# Patient Record
Sex: MALE | Race: ASIAN
[De-identification: names, ages, dates, MRNs, and addresses within clinical notes are randomized per-mention and may not be internally consistent; named-entity substitution may affect disease eponyms.]

---

## 2018-01-16 ENCOUNTER — HOSPITAL ENCOUNTER (EMERGENCY)
Dept: HOSPITAL 17 - PHEFT | Age: 28
Discharge: HOME | End: 2018-01-16
Payer: COMMERCIAL

## 2018-01-16 VITALS — HEIGHT: 66 IN | WEIGHT: 177.03 LBS | BODY MASS INDEX: 28.45 KG/M2

## 2018-01-16 VITALS
OXYGEN SATURATION: 97 % | SYSTOLIC BLOOD PRESSURE: 150 MMHG | RESPIRATION RATE: 16 BRPM | DIASTOLIC BLOOD PRESSURE: 67 MMHG | HEART RATE: 81 BPM | TEMPERATURE: 98.5 F

## 2018-01-16 DIAGNOSIS — S29.012A: ICD-10-CM
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
## 2018-01-16 DIAGNOSIS — Z72.0: ICD-10-CM

## 2018-01-16 DIAGNOSIS — S16.1XXA: Primary | ICD-10-CM

## 2018-01-16 PROCEDURE — 99283 EMERGENCY DEPT VISIT LOW MDM: CPT
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
